# Patient Record
Sex: MALE | URBAN - METROPOLITAN AREA
[De-identification: names, ages, dates, MRNs, and addresses within clinical notes are randomized per-mention and may not be internally consistent; named-entity substitution may affect disease eponyms.]

---

## 2024-02-05 ENCOUNTER — HOSPITAL ENCOUNTER (EMERGENCY)
Facility: HOSPITAL | Age: 49
Discharge: HOME | End: 2024-02-06
Attending: EMERGENCY MEDICINE
Payer: MEDICARE

## 2024-02-05 VITALS
OXYGEN SATURATION: 98 % | SYSTOLIC BLOOD PRESSURE: 128 MMHG | DIASTOLIC BLOOD PRESSURE: 79 MMHG | HEART RATE: 67 BPM | RESPIRATION RATE: 16 BRPM | TEMPERATURE: 98.7 F

## 2024-02-05 DIAGNOSIS — F91.9 BEHAVIOR DISTURBANCE: Primary | ICD-10-CM

## 2024-02-05 PROCEDURE — 99283 EMERGENCY DEPT VISIT LOW MDM: CPT | Performed by: EMERGENCY MEDICINE

## 2024-02-05 ASSESSMENT — COLUMBIA-SUICIDE SEVERITY RATING SCALE - C-SSRS
2. HAVE YOU ACTUALLY HAD ANY THOUGHTS OF KILLING YOURSELF?: NO
6. HAVE YOU EVER DONE ANYTHING, STARTED TO DO ANYTHING, OR PREPARED TO DO ANYTHING TO END YOUR LIFE?: NO
1. IN THE PAST MONTH, HAVE YOU WISHED YOU WERE DEAD OR WISHED YOU COULD GO TO SLEEP AND NOT WAKE UP?: NO

## 2024-02-05 ASSESSMENT — PAIN SCALES - GENERAL: PAINLEVEL_OUTOF10: 0 - NO PAIN

## 2024-02-05 ASSESSMENT — PAIN - FUNCTIONAL ASSESSMENT: PAIN_FUNCTIONAL_ASSESSMENT: 0-10

## 2024-02-06 NOTE — ED PROVIDER NOTES
HPI:  Patient is a 48-year-old male with a history of substance use disorder (does not specify) currently residing in a correction house who presents via EMS from scene with behavior disturbance concerns.  Patient got kicked out of his correction house today but does not specify why.  When correction house residents called EMS, patient states he did not want to go to the hospital but was brought here.  He denies any complaints including but not limited to nausea, vomiting, diarrhea, chest pain or shortness of breath.  Patient states he is homeless and hungry.  He denies SI or HI.  No tactile, visual or auditory hallucinations. Declining [facility] consultation.     ROS: A 10-system ROS was performed and was negative except as documented in the HPI.    PMH/PSH: Reviewed in EMR. As above in HPI.  SH: Denies EtOH or illicit drug use. Pt states he smokes cigarettes daily, does not quantify.   Allergies: No Known Allergies   Medications: See prescription writer for full medication list.     General: no acute distress, fatigued but appropriate conversation, poor hygiene  HEENT:  No rhinorrhea. MMM.  Cardiac: regular rate rhythm, no murmurs  Pulm:  normal respiratory effort on room air, equal chest expansion, clear bilaterally, no wheeze or crackles  GI: soft, nontender, nondistended, +BS  Extremities:  moves all extremities freely, no edema noted  Skin: warm, well-perfused, no lesions noted on exposed skin.  Neuro:  AOx3, moves all 4 extremities freely and independently   Psych: Does not appear to be responding to internal stimuli, appropriate eye contact, answers questions appropriately, normal mood and affect, calm/cooperative    Assessment/Plan/MDM  Patient is a 48-year-old male with a history of substance use disorder (does not specify) currently residing in a correction house who presents via EMS from scene with behavior disturbance concerns.  Patient is calm/cooperative, does not appear to be responding to internal stimuli.  Denies  any symptomatic complaints, denies SI/HI.  Do not feel the patient is an imminent danger to himself or others.  He does not use history and is declining [facility].  Patient provided with food, passed p.o. challenge and provided with a bus ticket at discharge.  Patient provided with shelter resources.    ED Course/Progress:    Diagnoses as of 02/05/24 7275   Behavior disturbance        Clinical Impression: as above  Dispo:   Home: I discussed the differential, results and discharge plan with the patient.  I emphasized the importance of follow-up with [facility] for placement.  I explained reasons for the patient to return to the Emergency Department.  Questions were addressed.  They understand return precautions and discharge instructions. The patient expressed understanding and agreement with assessment/plan.     Pt seen and discussed with attending physician, Dr. Jeferson Ng MD  PGY3, Emergency Medicine    Disclaimer: This note was dictated by speech recognition. An attempt at proof reading was made to minimize errors. Errors in transcription may be present.  Please call if questions.      Yesy Ng MD  Resident  02/06/24 [id number]

## 2024-02-06 NOTE — ED TRIAGE NOTES
From skilled nursing house via EMS for witness c/o patient passing out then running away and coming back- believed to be under the influence of an unknown substance